# Patient Record
Sex: FEMALE | Race: WHITE | NOT HISPANIC OR LATINO | Employment: OTHER | URBAN - METROPOLITAN AREA
[De-identification: names, ages, dates, MRNs, and addresses within clinical notes are randomized per-mention and may not be internally consistent; named-entity substitution may affect disease eponyms.]

---

## 2020-06-05 DIAGNOSIS — E04.2 NONTOXIC MULTINODULAR GOITER: Primary | ICD-10-CM

## 2020-07-10 ENCOUNTER — HOSPITAL ENCOUNTER (OUTPATIENT)
Dept: RADIOLOGY | Facility: HOSPITAL | Age: 65
Discharge: HOME | End: 2020-07-10
Attending: SURGERY
Payer: MEDICARE

## 2020-07-10 DIAGNOSIS — E04.2 NONTOXIC MULTINODULAR GOITER: ICD-10-CM

## 2020-07-10 PROCEDURE — 76536 US EXAM OF HEAD AND NECK: CPT | Mod: TC,PO

## 2021-08-02 DIAGNOSIS — E04.2 NONTOXIC MULTINODULAR GOITER: Primary | ICD-10-CM

## 2021-08-20 ENCOUNTER — HOSPITAL ENCOUNTER (OUTPATIENT)
Dept: RADIOLOGY | Facility: HOSPITAL | Age: 66
Discharge: HOME | End: 2021-08-20
Attending: SURGERY
Payer: MEDICARE

## 2021-08-20 DIAGNOSIS — E04.2 NONTOXIC MULTINODULAR GOITER: ICD-10-CM

## 2021-08-20 PROCEDURE — 76536 US EXAM OF HEAD AND NECK: CPT | Mod: TC,PO

## 2022-03-03 DIAGNOSIS — E04.2 NONTOXIC MULTINODULAR GOITER: Primary | ICD-10-CM

## 2022-03-11 ENCOUNTER — HOSPITAL ENCOUNTER (OUTPATIENT)
Dept: RADIOLOGY | Facility: HOSPITAL | Age: 67
Discharge: HOME | End: 2022-03-11
Attending: SURGERY
Payer: MEDICARE

## 2022-03-11 DIAGNOSIS — E04.2 NONTOXIC MULTINODULAR GOITER: ICD-10-CM

## 2022-03-11 PROCEDURE — 76536 US EXAM OF HEAD AND NECK: CPT | Mod: TC,PO

## 2022-07-07 ENCOUNTER — TELEPHONE (OUTPATIENT)
Dept: GASTROENTEROLOGY | Facility: CLINIC | Age: 67
End: 2022-07-07

## 2022-07-07 NOTE — TELEPHONE ENCOUNTER
Spoke with casi (Dr De La Vega's office ) to confirm status of pt referral. Also informed that will be sep or oct before we can schedule.

## 2022-07-07 NOTE — TELEPHONE ENCOUNTER
----- Message from Abhinav Pinedo sent at 7/7/2022  2:26 PM CDT -----  Regarding: Pt Advice  Contact: kathy (Dr. Espinal [facility])  Name of Who is Calling: Kathy (Dr. Espinal [facility])      What is the request in detail: Would like to speak with staff in regards to following up on referred sent in by Dr. CHAVA Espinal. Please advise      Can the clinic reply by [facility]: no      What [facility] to [facility] Back [facility]: [phone]

## 2022-08-03 ENCOUNTER — TELEPHONE (OUTPATIENT)
Dept: GASTROENTEROLOGY | Facility: CLINIC | Age: 67
End: 2022-08-03

## 2022-08-05 ENCOUNTER — TELEPHONE (OUTPATIENT)
Dept: GASTROENTEROLOGY | Facility: CLINIC | Age: 67
End: 2022-08-05

## 2022-08-05 NOTE — TELEPHONE ENCOUNTER
Called pt, no ans  lmvm re: discussing NP apt process  Asked pt to call the clinic, ph no provided

## 2022-08-08 ENCOUNTER — TELEPHONE (OUTPATIENT)
Dept: GASTROENTEROLOGY | Facility: CLINIC | Age: 67
End: 2022-08-08

## 2023-04-12 DIAGNOSIS — Z78.0 MENOPAUSE: Primary | ICD-10-CM

## 2023-04-12 DIAGNOSIS — Z12.31 ENCOUNTER FOR SCREENING MAMMOGRAM FOR MALIGNANT NEOPLASM OF BREAST: ICD-10-CM

## 2023-05-03 ENCOUNTER — HOSPITAL ENCOUNTER (OUTPATIENT)
Dept: RADIOLOGY | Facility: HOSPITAL | Age: 68
Discharge: HOME | End: 2023-05-03
Attending: INTERNAL MEDICINE
Payer: MEDICARE

## 2023-05-03 VITALS — WEIGHT: 110 LBS | HEIGHT: 55 IN | BODY MASS INDEX: 25.46 KG/M2

## 2023-05-03 DIAGNOSIS — Z78.0 MENOPAUSE: ICD-10-CM

## 2023-05-03 DIAGNOSIS — Z12.31 ENCOUNTER FOR SCREENING MAMMOGRAM FOR MALIGNANT NEOPLASM OF BREAST: ICD-10-CM

## 2023-05-03 PROCEDURE — 77080 DXA BONE DENSITY AXIAL: CPT | Mod: TC,PO

## 2023-05-03 PROCEDURE — 77067 SCR MAMMO BI INCL CAD: CPT | Mod: TC,PO

## 2023-10-18 DIAGNOSIS — E04.2 NONTOXIC MULTINODULAR GOITER: Primary | ICD-10-CM

## 2023-11-06 ENCOUNTER — HOSPITAL ENCOUNTER (OUTPATIENT)
Dept: RADIOLOGY | Facility: HOSPITAL | Age: 68
Discharge: HOME | End: 2023-11-06
Attending: INTERNAL MEDICINE
Payer: MEDICARE

## 2023-11-06 DIAGNOSIS — E04.2 NONTOXIC MULTINODULAR GOITER: ICD-10-CM

## 2023-11-06 PROCEDURE — 76536 US EXAM OF HEAD AND NECK: CPT | Mod: TC,PO

## 2024-05-28 NOTE — TELEPHONE ENCOUNTER
Called pt, no ans  lmvm re: discussing NP apt process  Asked pt to call the clinic and let us know if interested in this referral to Dr Sutherland from .  Clinic ph no provided   Patient asked questions/Verbalized Understanding

## 2024-09-06 DIAGNOSIS — Z12.31 ENCOUNTER FOR SCREENING MAMMOGRAM FOR MALIGNANT NEOPLASM OF BREAST: Primary | ICD-10-CM

## 2024-09-09 ENCOUNTER — HOSPITAL ENCOUNTER (OUTPATIENT)
Dept: RADIOLOGY | Facility: HOSPITAL | Age: 69
Discharge: HOME | End: 2024-09-09
Attending: INTERNAL MEDICINE
Payer: MEDICARE

## 2024-09-09 DIAGNOSIS — Z12.31 ENCOUNTER FOR SCREENING MAMMOGRAM FOR MALIGNANT NEOPLASM OF BREAST: ICD-10-CM

## 2024-09-09 PROCEDURE — 77063 BREAST TOMOSYNTHESIS BI: CPT | Mod: TC,PO

## 2024-09-09 PROCEDURE — 77063 BREAST TOMOSYNTHESIS BI: CPT | Mod: 26,,, | Performed by: RADIOLOGY

## 2024-09-09 PROCEDURE — 77067 SCR MAMMO BI INCL CAD: CPT | Mod: 26,,, | Performed by: RADIOLOGY

## 2025-03-10 DIAGNOSIS — E04.2 NONTOXIC MULTINODULAR GOITER: Primary | ICD-10-CM

## 2025-03-11 ENCOUNTER — HOSPITAL ENCOUNTER (OUTPATIENT)
Dept: RADIOLOGY | Facility: HOSPITAL | Age: 70
Discharge: HOME | End: 2025-03-11
Attending: INTERNAL MEDICINE
Payer: MEDICARE

## 2025-03-11 DIAGNOSIS — E04.2 NONTOXIC MULTINODULAR GOITER: ICD-10-CM

## 2025-03-11 PROCEDURE — 76536 US EXAM OF HEAD AND NECK: CPT | Mod: TC,PO

## 2025-03-11 PROCEDURE — 76536 US EXAM OF HEAD AND NECK: CPT | Mod: 26,,, | Performed by: RADIOLOGY

## 2025-03-25 DIAGNOSIS — R10.11 RUQ ABDOMINAL PAIN: Primary | ICD-10-CM

## 2025-03-26 ENCOUNTER — HOSPITAL ENCOUNTER (OUTPATIENT)
Dept: RADIOLOGY | Facility: HOSPITAL | Age: 70
Discharge: HOME | End: 2025-03-26
Attending: SPECIALIST
Payer: MEDICARE

## 2025-03-26 DIAGNOSIS — R10.11 RUQ ABDOMINAL PAIN: ICD-10-CM

## 2025-03-26 PROCEDURE — 76700 US EXAM ABDOM COMPLETE: CPT | Mod: 26,,, | Performed by: RADIOLOGY

## 2025-03-26 PROCEDURE — 76700 US EXAM ABDOM COMPLETE: CPT | Mod: TC,PO

## 2025-03-31 DIAGNOSIS — R10.11 RIGHT UPPER QUADRANT ABDOMINAL PAIN: Primary | ICD-10-CM

## 2025-04-02 ENCOUNTER — HOSPITAL ENCOUNTER (OUTPATIENT)
Dept: RADIOLOGY | Facility: HOSPITAL | Age: 70
Discharge: HOME | End: 2025-04-02
Attending: SPECIALIST
Payer: MEDICARE

## 2025-04-02 VITALS — BODY MASS INDEX: 25.57 KG/M2 | WEIGHT: 110 LBS

## 2025-04-02 DIAGNOSIS — R10.11 RIGHT UPPER QUADRANT ABDOMINAL PAIN: ICD-10-CM

## 2025-04-02 PROCEDURE — 78227 HEPATOBIL SYST IMAGE W/DRUG: CPT | Mod: TC

## 2025-04-02 PROCEDURE — A9537 TC99M MEBROFENIN: HCPCS

## 2025-04-02 PROCEDURE — 78227 HEPATOBIL SYST IMAGE W/DRUG: CPT | Mod: 26,,, | Performed by: RADIOLOGY

## 2025-04-02 PROCEDURE — 63600175 PHARM REV CODE 636 W HCPCS

## 2025-04-02 RX ORDER — KIT FOR THE PREPARATION OF TECHNETIUM TC 99M MEBROFENIN 45 MG/10ML
8.4 INJECTION, POWDER, LYOPHILIZED, FOR SOLUTION INTRAVENOUS
Status: COMPLETED | OUTPATIENT
Start: 2025-04-02 | End: 2025-04-02

## 2025-04-02 RX ORDER — SINCALIDE 5 UG/5ML
0.02 INJECTION, POWDER, LYOPHILIZED, FOR SOLUTION INTRAVENOUS ONCE
Status: COMPLETED | OUTPATIENT
Start: 2025-04-02 | End: 2025-04-02

## 2025-04-02 RX ADMIN — SINCALIDE 1 MCG: 5 INJECTION, POWDER, LYOPHILIZED, FOR SOLUTION INTRAVENOUS at 12:04

## 2025-04-02 RX ADMIN — KIT FOR THE PREPARATION OF TECHNETIUM TC 99M MEBROFENIN 8.4 MILLICURIE: 45 INJECTION, POWDER, LYOPHILIZED, FOR SOLUTION INTRAVENOUS at 11:04

## 2025-04-28 DIAGNOSIS — Z78.0 MENOPAUSE: Primary | ICD-10-CM

## 2025-05-07 ENCOUNTER — HOSPITAL ENCOUNTER (OUTPATIENT)
Dept: RADIOLOGY | Facility: HOSPITAL | Age: 70
Discharge: HOME | End: 2025-05-07
Attending: INTERNAL MEDICINE
Payer: MEDICARE

## 2025-05-07 DIAGNOSIS — Z78.0 MENOPAUSE: ICD-10-CM

## 2025-05-07 PROCEDURE — 77080 DXA BONE DENSITY AXIAL: CPT | Mod: TC,PO

## 2025-05-07 PROCEDURE — 77080 DXA BONE DENSITY AXIAL: CPT | Mod: 26,,, | Performed by: RADIOLOGY

## 2025-06-03 DIAGNOSIS — Q27.8 DYSPHAGIA LUSORIA: ICD-10-CM

## 2025-06-03 DIAGNOSIS — D80.2 IGA DEFICIENCY: ICD-10-CM

## 2025-06-03 DIAGNOSIS — Z80.0 FAMILY HISTORY OF COLON CANCER: ICD-10-CM

## 2025-06-03 DIAGNOSIS — B96.81 HELICOBACTER PYLORI (H. PYLORI) AS THE CAUSE OF DISEASES CLASSIFIED ELSEWHERE: ICD-10-CM

## 2025-06-03 DIAGNOSIS — D80.3 IGG DEFICIENCY: ICD-10-CM

## 2025-06-03 DIAGNOSIS — Q27.8 ABERRANT SUBCLAVIAN ARTERY: Primary | ICD-10-CM

## 2025-07-08 ENCOUNTER — HOSPITAL ENCOUNTER (OUTPATIENT)
Dept: RADIOLOGY | Facility: HOSPITAL | Age: 70
Discharge: HOME | End: 2025-07-08
Attending: INTERNAL MEDICINE
Payer: MEDICARE

## 2025-07-08 ENCOUNTER — CLINICAL SUPPORT (OUTPATIENT)
Dept: REHABILITATION | Facility: HOSPITAL | Age: 70
End: 2025-07-08
Attending: INTERNAL MEDICINE
Payer: MEDICARE

## 2025-07-08 DIAGNOSIS — B96.81 HELICOBACTER PYLORI (H. PYLORI) AS THE CAUSE OF DISEASES CLASSIFIED ELSEWHERE: ICD-10-CM

## 2025-07-08 DIAGNOSIS — Z80.0 FAMILY HISTORY OF COLON CANCER: ICD-10-CM

## 2025-07-08 DIAGNOSIS — Q27.8 ABERRANT SUBCLAVIAN ARTERY: ICD-10-CM

## 2025-07-08 DIAGNOSIS — Q27.8 DYSPHAGIA LUSORIA: ICD-10-CM

## 2025-07-08 DIAGNOSIS — D80.3 IGG DEFICIENCY: ICD-10-CM

## 2025-07-08 DIAGNOSIS — D80.2 IGA DEFICIENCY: ICD-10-CM

## 2025-07-08 PROCEDURE — 25500020 PHARM REV CODE 255: Performed by: INTERNAL MEDICINE

## 2025-07-08 PROCEDURE — 74230 X-RAY XM SWLNG FUNCJ C+: CPT | Mod: 26,,, | Performed by: RADIOLOGY

## 2025-07-08 PROCEDURE — 74230 X-RAY XM SWLNG FUNCJ C+: CPT | Mod: TC

## 2025-07-08 PROCEDURE — 92611 MOTION FLUOROSCOPY/SWALLOW: CPT | Mod: PO

## 2025-07-08 PROCEDURE — 92610 EVALUATE SWALLOWING FUNCTION: CPT | Mod: PO

## 2025-07-08 PROCEDURE — A9698 NON-RAD CONTRAST MATERIALNOC: HCPCS | Performed by: INTERNAL MEDICINE

## 2025-07-08 RX ADMIN — BARIUM SULFATE 90 ML: 0.81 POWDER, FOR SUSPENSION ORAL at 01:07

## 2025-07-08 NOTE — PROGRESS NOTES
Outpatient Rehab  Modified Barium Swallow Study     Patient Name: Yaquelin Lopez  MRN: [id number]  YOB: 1955  Encounter Date: 7/8/2025    Therapy Diagnosis:   Encounter Diagnoses   Name Primary?    Aberrant subclavian artery     Dysphagia lusoria     Family history of colon cancer     Helicobacter pylori (H. pylori) as the cause of diseases classified elsewhere     IgA deficiency     IgG deficiency      Physician: Frida De La Vega MD    Physician Orders: Modified Barium Swallow Study   Medical Diagnosis: Aberrant subclavian artery  Dysphagia lusoria  Family history of colon cancer  Helicobacter pylori (H. pylori) as the cause of diseases classified elsewhere  IgA deficiency  IgG deficiency  Surgical Diagnosis: Not applicable for this Episode   Surgical Date: Not applicable for this Episode  Days Since Last Surgery: Not applicable for this Episode    Visit # / Visits Authorized: 1 / 1   Insurance Authorization Period: 6/3/2025 to 12/31/2025  Date of Evaluation: 7/8/2025      Time In: 1320   Time Out: 1350  Total Time (in minutes): 30   Total Billable Time (in minutes): 30      Precautions:  General precautions include: Aspiration/choking.      Standard precautions    Subjective   History of Present Illness  Yaquelin is a 69 y.o. female who reports to Speech-Language Pathology with a chief concern of globus sensation, mucous production.     The patient reports a medical diagnosis of Aberrant subclavian artery (Q27.8), Dysphagia lusoria (Q27.8), Family history of colon cancer (Z80.0), Helicobacter pylori (H. 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        Patient is a pleasant 69 year old female with a PMH remarkable for anxiety, depression, and multiple thyroid nodules who presents today for outpatient Modified Barium Swallow Study to instrumentally assess oropharyngeal swallow physiology due to complaints of globus sensation and  "excess mucous production for "a couple years". Patient denied any dysphagia symptoms with liquids but reported that food and pills feels "stuck" at times. She endorses overall improvements in her mucous production since initiating a new medication per Gastroenterologist (GI). She denied any unintentional weight loss or pneumonia.         Patient presents for study breathing on Room air.         Current Swallow Symptoms and Diet  Associated Swallowing Symptoms: Food gets stuck, Globus sensation, Difficulty swallowing pills  Current Mode of Intake: Oral diet  Oral Intake Details: Primary source of nutrition  Current Drink Consistency: Thin (IDDSI 0)  Current Food Consistency: Regular (IDDSI 7)  Ability to Self-Feed: Independent         Pain  No Pain Reported: Yes           Past Medical History/Physical Systems Review:   Yaquelin Lopez  has no past medical history on file.    Yaquelin Lopez  has no past surgical history on file.    Yaquelin currently has no medications in their medication list.    Review of patient's allergies indicates:  No Known Allergies       Objective   Oral Peripheral Exam  Dentition and Oral Hygiene  The patient's current dental condition: Adequate.   Oral hygiene is Good.       Buccal and Oral Mucosa Exam  Buccal sensation-CN V-is Normal. Buccal strength-CN VII-is Normal. Oral mucosa observations: Normal       Labial Exam  Labial Symmetry is Normal.  Range of motion-CN VII-is Normal. Alternating labial protrusion/retraction is Intact. Strength-CN VII-is Normal. Tone is Normal. Coordination is Intact. Sensation-CN V-is Normal.   Seal is Normal.       Lingual Exam  Range of motion-CN XII-is Normal. Strength-CN XII-is Normal. Symmetry-CN XII-is Normal. Tone is Normal. Speed is Intact. Coordination is Intact. Sensation-CN V & IX-is Normal.          Velar and Uvular Exam  Velar elevation during phonation-CN X-is Normal. Sustained velar elevation is Intact.   Velum at rest is Intact. Velar tone is " Normal.          Jaw Exam  Range of motion-CN V-is Normal.   Strength-CN V-is Normal.             Face and Neck Exam  Facial symmetry-CN VII-is Normal.   Facial sensation-CN V-is Normal.              Videofluroscopic Swallow Study  VFSS Exam Details  Reason for VFSS: Objectively assess swallow anatomy and physiology    Barium Type: Varibar thin, Varibar mildly thick/nectar, Varibar pudding    Video Speed: 30 fps  Patient Position During VFSS: Upright in chair   Views Taken: Anterior-posterior, Lateral  Trials presented by: Self     Drink Consistencies Presented: Thin (IDDSI 0), Mildly thick (IDDSI 2)   Food Consistencies Presented: Pureed (IDDSI 4), Soft and bite-sized (IDDSI 6), Regular (IDDSI 7)      Consistency Trials  Thin, IDDSI 0 - Presented 5 mL x2, 10 mL x2, self-regulated cup, self-regulated straw, rapid consecutive straw     Mildly Thick, IDDSI 2 - Presented 5 mL x2, 10 mL x2     Extremely Thick/Pureed, IDDSI 4 - Presented 5 mL, 10 mL x2     Soft and Bite-Sized, IDDSI 6 - Presented two peaches in juice, spoonful of peaches in juice     Regular, IDDSI 7 - Presented self-regulated bite       Oral Phase  Lip Closure: Full  Bolus Loss: No loss  Lingual Control: No loss  Mastication: Prompt and efficient  Oral Residue: None    Pharyngeal Phase  Bolus Head Location When Swallow Initiation Occurs: Valleculae  Soft Palate Elevation: Adequate  Tongue Base Retraction: Complete  Tongue Base Retraction - Air/Contrast Column: None  Laryngeal Elevation: Complete  Hyoid Movement: Complete   Laryngeal Vestibular Closure: Complete  Epiglottic Inversion: Present  Pharyngeal Constriction/Stripping Wave: Present  Pharyngeal Contraction: Complete  Pharyngeal Residue: None    Rosenbek's Penetration-Aspiration Scale  Thin, IDDSI 0: WORST 2-Material enters the airway, remains above the vocal folds, and is ejected from the airway. BEST 1-Material does not enter the airway.  Mildly Thick, IDDSI 2: 1-Material does not enter the  airway.  Extremely Thick/Pureed, IDDSI 4: 1-Material does not enter the airway.  Soft and Bite-Sized, IDDSI 6: 1-Material does not enter the airway.  Regular, IDDSI 7: 1-Material does not enter the airway.     Esophageal Sweep: retrograde flow of bolus below the UES observed and delayed emptying/retention of bolus were noted      Retention of puree on A-P view      Time Entry(in minutes):  Clinical Swallow Eval Time Entry: 10  Motion Fluoro Swallow Eval by cine/video Time Entry: 20    Assessment & Plan   Assessment   Yaquelin presents with symptoms that are Stable.     Yaquelin is a 70 y/o female referred for Modified Barium Swallow Study with a medical diagnosis of Aberrant subclavian artery Q27.8, Dysphagia lusoria Q27.8, Family history of colon cancer Z80.0, Helicobacter pylori (H. pylori) as the cause of diseases classified elsewhere B96.81, IgA deficiency D80.2, IgG deficiency D80.3. The patient presents with WFL oropharyngeal swallow physiology as determined by the Dysphagia Outcome and Severity Scale (DUDLEY). Level 7: Normal in all situations.    Modified Barium Swallow Study (MBSS) revealed oral phase characterized by adequate lingual and labial strength and range of motion for tongue control, bolus preparation and transport. Lip closure was adequate with no labial escape. Bolus prep and mastication was timely and efficient. Lingual motion was brisk for adequate bolus transport. There was no significant oral residue. The swallow was initiated when the head of the bolus entered the vallecula.    Pharyngeal phase characterized by timely initiation of swallow across consistencies. The soft palate elevated for complete of the velopharyngeal port. During pharyngeal swallow, adequate base of tongue retraction, anterior hyoid excursion, laryngeal elevation, and pharyngeal stripping wave resulted in complete epiglottic inversion and UES opening with only two instances of trace, flash penetration which cleared the  laryngeal vestibule during the swallow. No aspiration or significant pharyngeal residue was observed in today's study.     On esophageal screen, dysmotility and retrograde flow below the pharyngo-esophageal segment  were noted. Further esophageal imaging including EGD and/or barium esophagram as well as follow-up with GI is recommended. Patient reports pending barium esophagram in the coming days.     Impressions: Patient presents with WFL oral and pharyngeal phases of the swallow. In consideration of the Dynamic Imaging Grade of Swallowing Toxicity (DIGEST) (Wilfredo et al, 2017), patient presents with preserved safety of swallow and preserved efficiency of swallow. Patient appears to be at low risk for aspiration related PNA in consideration of three pillars of aspiration pneumonia (Gerardo, 2005) including oral health status, overall health/immune status, and laryngeal vestibule closure/severity of dysphagia. However, unable to assess risk related to aspiration pneumonia cause by the aspiration of gastric content. There is no indication for behavioral swallow rehabilitation at this time.     Functional Oral Intake Scale (FOIS)  The Functional Oral Intake Scale (FOIS) is an ordinal scale that is used to assess the current status and meaningful change in the oral intake. FOIS levels include:    TUBE DEPENDENT (levels 1-3) 1. No oral intake  2. Tube dependent with minimal/inconsistent oral intake  3. Tube supplements with consistent oral intake      TOTAL ORAL INTAKE (levels 4-7) 4. Total oral intake of a single consistency  5. Total oral intake of multiple consistencies requiring special preparation  6. Total oral intake with no special preparation, but must avoid specific foods or liquid items  7. Total oral intake with no restrictions     Patient is currently judged to be at FOIS level 7.      References:  ZHOU Carrillo (2005, March). Pneumonia: Factors Beyond Aspiration. Perspectives in Swallowing and  Swallowing Disorders (Dysphagia), 14, 10-16.  Karissa KA, Mo MP, Oliverio DA, Janell JK, Darlene HY, Carroll RS, Nallely CD, Alec SY, Lalo CP, Lolis J, Lazarus CL, May A, Asia J, Hernan JW, Ellyn HM, Rebecca JS. Dynamic Imaging Grade of Swallowing Toxicity (DIGEST): Scale development and validation. Cancer. 2017 Jan 1;123(1):62-70. doi: 10.1002/cncr.53696. Epub 2016 Aug 26. PMID: 75638420; PMCID: NOB9632850.    Recommendations:     Consistency Recommendations: Thin liquids (IDDSI 0) and Regular consistencies (IDDSI 7).   Risk Management: use good oral hygiene , sit upright for all PO intake, increase physical mobility as tolerated, behavioral reflux precautions, and remain upright for at least 2-3 hours following any PO intake  Specialist Referrals: follow up with Gastroenterologist (GI) given findings on esophageal sweep  Therapy: Dysphagia therapy is not recommended at this time.  Follow-up exam: Follow up swallow study is not indicated at this time.    Please contact [facility] at [phone] if there are questions re: the above or if we can be of additional service to this patient.    Education  Education was done with Patient. The patient's learning style includes Listening. The patient Verbalizes understanding.         Plan  From a speech language pathology perspective, the patient would not benefit from: Skilled Rehab Services        The patient's spiritual, cultural, and educational needs were considered, and the patient is agreeable to the plan of care and goals.       GUSTAVO Ugarte-SLP, CCC-SLP

## 2025-07-08 NOTE — LETTER
July 8, 2025  Frida De La Vega [place]  [address] [facility] [place] [address]      To whom it may concern,     The attached plan of care is being sent to you for review and reference.    You may indicate your approval by signing the document electronically, or by faxing/mailing a signed copy of the final page of this document back to the attention of MARY Ugarte, CCC-SLP:         Plan of Care 7/8/25   Effective from: 7/8/2025  Effective to: 7/8/2025    Plan ID: [id number]            Participants as of Finalize on 7/8/2025    Name Type Comments Contact Info    Frida De La Vega MD Referring Provider  [phone]    MARY Ugarte, CCC-SLP Speech Language Pathologist         Last Plan Note     Author: Angelica Hutchison L-SLP, CCC-SLP Status: Signed Last edited: 7/8/2025  1:00 PM         Outpatient Rehab  Modified Barium Swallow Study     Patient Name: Yaquelin Lopez  MRN: [id number]  YOB: 1955  Encounter Date: 7/8/2025    Therapy Diagnosis:   Encounter Diagnoses   Name Primary?    Aberrant subclavian artery     Dysphagia lusoria     Family history of colon cancer     Helicobacter pylori (H. pylori) as the cause of diseases classified elsewhere     IgA deficiency     IgG deficiency      Physician: Frida De La Vega MD    Physician Orders: Modified Barium Swallow Study   Medical Diagnosis: Aberrant subclavian artery  Dysphagia lusoria  Family history of colon cancer  Helicobacter pylori (H. pylori) as the cause of diseases classified elsewhere  IgA deficiency  IgG deficiency  Surgical Diagnosis: Not applicable for this Episode   Surgical Date: Not applicable for this Episode  Days Since Last Surgery: Not applicable for this Episode    Visit # / Visits Authorized: 1 / 1   Insurance Authorization Period: 6/3/2025 to 12/31/2025  Date of Evaluation: 7/8/2025      Time In: 1320   Time Out: 1350  Total Time (in minutes): 30   Total Billable Time (in minutes):  "30      Precautions:  General precautions include: Aspiration/choking.      Standard precautions    Subjective   History of Present Illness  Yaquelin is a 69 y.o. female who reports to Speech-Language Pathology with a chief concern of globus sensation, mucous production.     The patient reports a medical diagnosis of Aberrant subclavian artery (Q27.8), Dysphagia lusoria (Q27.8), Family history of colon cancer (Z80.0), Helicobacter pylori (H. 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        Patient is a pleasant 69 year old female with a PMH remarkable for anxiety, depression, and multiple thyroid nodules who presents today for outpatient Modified Barium Swallow Study to instrumentally assess oropharyngeal swallow physiology due to complaints of globus sensation and excess mucous production for "a couple years". Patient denied any dysphagia symptoms with liquids but reported that food and pills feels "stuck" at times. She endorses overall improvements in her mucous production since initiating a new medication per Gastroenterologist (GI). She denied any unintentional weight loss or pneumonia.         Patient presents for study breathing on Room air.         Current Swallow Symptoms and Diet  Associated Swallowing Symptoms: Food gets stuck, Globus sensation, Difficulty swallowing pills  Current Mode of Intake: Oral diet  Oral Intake Details: Primary source of nutrition  Current Drink Consistency: Thin (IDDSI 0)  Current Food Consistency: Regular (IDDSI 7)  Ability to Self-Feed: Independent         Pain  No Pain Reported: Yes           Past Medical History/Physical Systems Review:   Yaquelin Lopez  has no past medical history on file.    Yaquelin Lopez  has no past surgical history on file.    Yaquelin currently has no medications in their medication list.    Review of patient's allergies indicates:  No Known Allergies       Objective   Oral " Peripheral Exam  Dentition and Oral Hygiene  The patient's current dental condition: Adequate.   Oral hygiene is Good.       Buccal and Oral Mucosa Exam  Buccal sensation-CN V-is Normal. Buccal strength-CN VII-is Normal. Oral mucosa observations: Normal       Labial Exam  Labial Symmetry is Normal.  Range of motion-CN VII-is Normal. Alternating labial protrusion/retraction is Intact. Strength-CN VII-is Normal. Tone is Normal. Coordination is Intact. Sensation-CN V-is Normal.   Seal is Normal.       Lingual Exam  Range of motion-CN XII-is Normal. Strength-CN XII-is Normal. Symmetry-CN XII-is Normal. Tone is Normal. Speed is Intact. Coordination is Intact. Sensation-CN V & IX-is Normal.          Velar and Uvular Exam  Velar elevation during phonation-CN X-is Normal. Sustained velar elevation is Intact.   Velum at rest is Intact. Velar tone is Normal.          Jaw Exam  Range of motion-CN V-is Normal.   Strength-CN V-is Normal.             Face and Neck Exam  Facial symmetry-CN VII-is Normal.   Facial sensation-CN V-is Normal.              Videofluroscopic Swallow Study  VFSS Exam Details  Reason for VFSS: Objectively assess swallow anatomy and physiology    Barium Type: Varibar thin, Varibar mildly thick/nectar, Varibar pudding    Video Speed: 30 fps  Patient Position During VFSS: Upright in chair   Views Taken: Anterior-posterior, Lateral  Trials presented by: Self     Drink Consistencies Presented: Thin (IDDSI 0), Mildly thick (IDDSI 2)   Food Consistencies Presented: Pureed (IDDSI 4), Soft and bite-sized (IDDSI 6), Regular (IDDSI 7)      Consistency Trials  Thin, IDDSI 0 - Presented 5 mL x2, 10 mL x2, self-regulated cup, self-regulated straw, rapid consecutive straw     Mildly Thick, IDDSI 2 - Presented 5 mL x2, 10 mL x2     Extremely Thick/Pureed, IDDSI 4 - Presented 5 mL, 10 mL x2     Soft and Bite-Sized, IDDSI 6 - Presented two peaches in juice, spoonful of peaches in juice     Regular, IDDSI 7 - Presented  self-regulated bite       Oral Phase  Lip Closure: Full  Bolus Loss: No loss  Lingual Control: No loss  Mastication: Prompt and efficient  Oral Residue: None    Pharyngeal Phase  Bolus Head Location When Swallow Initiation Occurs: Valleculae  Soft Palate Elevation: Adequate  Tongue Base Retraction: Complete  Tongue Base Retraction - Air/Contrast Column: None  Laryngeal Elevation: Complete  Hyoid Movement: Complete   Laryngeal Vestibular Closure: Complete  Epiglottic Inversion: Present  Pharyngeal Constriction/Stripping Wave: Present  Pharyngeal Contraction: Complete  Pharyngeal Residue: None    Rosenbek's Penetration-Aspiration Scale  Thin, IDDSI 0: WORST 2-Material enters the airway, remains above the vocal folds, and is ejected from the airway. BEST 1-Material does not enter the airway.  Mildly Thick, IDDSI 2: 1-Material does not enter the airway.  Extremely Thick/Pureed, IDDSI 4: 1-Material does not enter the airway.  Soft and Bite-Sized, IDDSI 6: 1-Material does not enter the airway.  Regular, IDDSI 7: 1-Material does not enter the airway.     Esophageal Sweep: retrograde flow of bolus below the UES observed and delayed emptying/retention of bolus were noted      Retention of puree on A-P view      Time Entry(in minutes):  Clinical Swallow Eval Time Entry: 10  Motion Fluoro Swallow Eval by cine/video Time Entry: 20    Assessment & Plan   Assessment   Yaquelin presents with symptoms that are Stable.     Yaquelin is a 70 y/o female referred for Modified Barium Swallow Study with a medical diagnosis of Aberrant subclavian artery Q27.8, Dysphagia lusoria Q27.8, Family history of colon cancer Z80.0, Helicobacter pylori (H. pylori) as the cause of diseases classified elsewhere B96.81, IgA deficiency D80.2, IgG deficiency D80.3. The patient presents with WFL oropharyngeal swallow physiology as determined by the Dysphagia Outcome and Severity Scale (DUDLEY). Level 7: Normal in all situations.    Modified Barium Swallow  Study (MBSS) revealed oral phase characterized by adequate lingual and labial strength and range of motion for tongue control, bolus preparation and transport. Lip closure was adequate with no labial escape. Bolus prep and mastication was timely and efficient. Lingual motion was brisk for adequate bolus transport. There was no significant oral residue. The swallow was initiated when the head of the bolus entered the vallecula.    Pharyngeal phase characterized by timely initiation of swallow across consistencies. The soft palate elevated for complete of the velopharyngeal port. During pharyngeal swallow, adequate base of tongue retraction, anterior hyoid excursion, laryngeal elevation, and pharyngeal stripping wave resulted in complete epiglottic inversion and UES opening with only two instances of trace, flash penetration which cleared the laryngeal vestibule during the swallow. No aspiration or significant pharyngeal residue was observed in today's study.     On esophageal screen, dysmotility and retrograde flow below the pharyngo-esophageal segment  were noted. Further esophageal imaging including EGD and/or barium esophagram as well as follow-up with GI is recommended. Patient reports pending barium esophagram in the coming days.     Impressions: Patient presents with WFL oral and pharyngeal phases of the swallow. In consideration of the Dynamic Imaging Grade of Swallowing Toxicity (DIGEST) (Wilfredo et al, 2017), patient presents with preserved safety of swallow and preserved efficiency of swallow. Patient appears to be at low risk for aspiration related PNA in consideration of three pillars of aspiration pneumonia (Gerardo, 2005) including oral health status, overall health/immune status, and laryngeal vestibule closure/severity of dysphagia. However, unable to assess risk related to aspiration pneumonia cause by the aspiration of gastric content. There is no indication for behavioral swallow rehabilitation  at this time.     Functional Oral Intake Scale (FOIS)  The Functional Oral Intake Scale (FOIS) is an ordinal scale that is used to assess the current status and meaningful change in the oral intake. FOIS levels include:    TUBE DEPENDENT (levels 1-3) 1. No oral intake  2. Tube dependent with minimal/inconsistent oral intake  3. Tube supplements with consistent oral intake      TOTAL ORAL INTAKE (levels 4-7) 4. Total oral intake of a single consistency  5. Total oral intake of multiple consistencies requiring special preparation  6. Total oral intake with no special preparation, but must avoid specific foods or liquid items  7. Total oral intake with no restrictions     Patient is currently judged to be at FOIS level 7.      References:  ZHOU Carrillo (2005, March). Pneumonia: Factors Beyond Aspiration. Perspectives in Swallowing and Swallowing Disorders (Dysphagia), 14, 10-16.  Karissa KA, Mo MP, Oliverio DA, Janell RODRIGUEZK, Darlene HY, Carroll RS, Nallely CD, Alec SY, Lalo CP, Lolis J, Lazarus CL, May A, Asia J, Hernan JW, Ellyn HM, Rebecca JS. Dynamic Imaging Grade of Swallowing Toxicity (DIGEST): Scale development and validation. Cancer. 2017 Jan 1;123(1):62-70. doi: 10.1002/cncr.88102. Epub 2016 Aug 26. PMID: 61771859; PMCID: UHG0027927.    Recommendations:     Consistency Recommendations: Thin liquids (IDDSI 0) and Regular consistencies (IDDSI 7).   Risk Management: use good oral hygiene , sit upright for all PO intake, increase physical mobility as tolerated, behavioral reflux precautions, and remain upright for at least 2-3 hours following any PO intake  Specialist Referrals: follow up with Gastroenterologist (GI) given findings on esophageal sweep  Therapy: Dysphagia therapy is not recommended at this time.  Follow-up exam: Follow up swallow study is not indicated at this time.    Please contact [facility] at [phone] if there are questions re: the above or if we can  be of additional service to this patient.    Education  Education was done with Patient. The patient's learning style includes Listening. The patient Verbalizes understanding.         Plan  From a speech language pathology perspective, the patient would not benefit from: Skilled Rehab Services        The patient's spiritual, cultural, and educational needs were considered, and the patient is agreeable to the plan of care and goals.       MARY Ugarte, CCC-SLP           Current Participants as of 7/8/2025    Name Type Comments Contact Info    Frida De La Vega MD Referring Provider  [phone]    Signature pending    MARY Ugarte, CCC-SLP Speech Language Pathologist                  Sincerely,      MARY Ugarte, CCC-SLP  [facility]                                                            Dear MARY Ugarte, CCC-SLP,    RE: Ms. Yaquelin Lopez, MRN: [id number]    I certify that I have reviewed the attached plan of care and agree to the details within.        ___________________________  ___________________________  Provider Printed Name   Provider Signed Name      ___________________________  Date and Time

## 2025-07-10 ENCOUNTER — HOSPITAL ENCOUNTER (OUTPATIENT)
Dept: RADIOLOGY | Facility: HOSPITAL | Age: 70
Discharge: HOME | End: 2025-07-10
Attending: INTERNAL MEDICINE
Payer: MEDICARE

## 2025-07-10 DIAGNOSIS — Q27.8 DYSPHAGIA LUSORIA: ICD-10-CM

## 2025-07-10 DIAGNOSIS — Q27.8 ABERRANT SUBCLAVIAN ARTERY: ICD-10-CM

## 2025-07-10 DIAGNOSIS — Z80.0 FAMILY HISTORY OF COLON CANCER: ICD-10-CM

## 2025-07-10 DIAGNOSIS — B96.81 HELICOBACTER PYLORI (H. PYLORI) AS THE CAUSE OF DISEASES CLASSIFIED ELSEWHERE: ICD-10-CM

## 2025-07-10 DIAGNOSIS — D80.2 IGA DEFICIENCY: ICD-10-CM

## 2025-07-10 DIAGNOSIS — D80.3 IGG DEFICIENCY: ICD-10-CM

## 2025-07-10 PROCEDURE — 25500020 PHARM REV CODE 255

## 2025-07-10 PROCEDURE — 74220 X-RAY XM ESOPHAGUS 1CNTRST: CPT | Mod: 26,,, | Performed by: RADIOLOGY

## 2025-07-10 PROCEDURE — A9698 NON-RAD CONTRAST MATERIALNOC: HCPCS

## 2025-07-10 PROCEDURE — 74220 X-RAY XM ESOPHAGUS 1CNTRST: CPT | Mod: TC

## 2025-07-10 RX ADMIN — BARIUM SULFATE 355 ML: 0.6 SUSPENSION ORAL at 08:07

## 2025-07-22 ENCOUNTER — TELEPHONE (OUTPATIENT)
Dept: VASCULAR SURGERY | Facility: CLINIC | Age: 70
End: 2025-07-22
Payer: MEDICARE

## 2025-07-22 NOTE — TELEPHONE ENCOUNTER
Received referral for pt.to see vascular from  at [facility]. Called pt. and no answer. Left message to call our office.

## 2025-07-22 NOTE — TELEPHONE ENCOUNTER
Copied from CRM #[id number]. Topic: General Inquiry - Return Call  >> Jul 22, 2025  9:58 AM Samira Silver wrote:  Type:  Patient Returning Call    Who Called:self     Who Left Message for Patient:Suzie Mohr LPN    Does the patient know what this is regarding?:yes    Would the patient rather a call back or a response via [facility]? Call    Best Call Back Number:.[phone]    Additional Information:

## 2025-07-22 NOTE — TELEPHONE ENCOUNTER
Called and spoke with pt. Aware we received a referral for her to see vascular  from . Pt.declined first available and wants appt. at end of September. States her  will be having surgery soon and needs to recover. Pt. wants her  to be present at visit. Pt. aware if she has any urgent issues or concerns prior to appt. not to wait until appt. to go to the ER. Pt. verbalized understanding. Pt. aware of location of appt. If has any questions prior she has our office number or she can send a message via my chart.